# Patient Record
Sex: MALE | Race: WHITE | NOT HISPANIC OR LATINO
[De-identification: names, ages, dates, MRNs, and addresses within clinical notes are randomized per-mention and may not be internally consistent; named-entity substitution may affect disease eponyms.]

---

## 2024-09-16 ENCOUNTER — APPOINTMENT (OUTPATIENT)
Dept: ORTHOPEDIC SURGERY | Facility: CLINIC | Age: 64
End: 2024-09-16

## 2024-09-16 VITALS — BODY MASS INDEX: 27.83 KG/M2 | WEIGHT: 210 LBS | HEIGHT: 73 IN

## 2024-09-16 DIAGNOSIS — Z78.9 OTHER SPECIFIED HEALTH STATUS: ICD-10-CM

## 2024-09-16 DIAGNOSIS — M65.342 TRIGGER FINGER, LEFT RING FINGER: ICD-10-CM

## 2024-09-16 PROBLEM — Z00.00 ENCOUNTER FOR PREVENTIVE HEALTH EXAMINATION: Status: ACTIVE | Noted: 2024-09-16

## 2024-09-16 PROCEDURE — 99203 OFFICE O/P NEW LOW 30 MIN: CPT | Mod: 25

## 2024-09-16 PROCEDURE — 76942 ECHO GUIDE FOR BIOPSY: CPT

## 2024-09-16 PROCEDURE — 20550 NJX 1 TENDON SHEATH/LIGAMENT: CPT | Mod: LT

## 2024-09-16 PROCEDURE — 73130 X-RAY EXAM OF HAND: CPT | Mod: LT

## 2024-09-16 NOTE — PROCEDURE
[FreeTextEntry3] : Tendon sheath injection l4 a1 was performed. The indication for this procedure was pain and inflammation. The site was prepped with alcohol, betadine, ethyl chloride sprayed topically and sterile technique used. An injection of Betamethasone (Celestone) 1cc of 3mg, Lidocaine 1cc of 1%  was used.  Patient tolerated procedure well.   The risks benefits, and alternatives have been discussed, and verbal consent was obtained.  Ultrasound guidance was indicated for this patient due to prior failure or difficult injection. All ultrasound images have been permanently captured and stored accordingly in our picture archiving and communication system. Visualization of the needle and placement of injection was performed without complication.

## 2024-09-16 NOTE — PHYSICAL EXAM
Wound Progress Note    Chief Complaint   Chief Complaint   Patient presents with   • Wound     Pt reports the insert on his walking shoe buckled while he was walking earlier this week.  Pt states he was unable to fix the problem until he got home 4 miles later. He reports soreness at his plantar arch from same.                                                            Wound Assessments      Wound Heel Right Plantar Traumatic (Active)   Date First Assessed/Time First Assessed: 09/22/23 1014   Location: Heel  Laterality: Right  Modifier: Plantar  Primary Wound Type: Traumatic      Assessments 10/13/2023 10:00 AM   Wound Image     Dressing Assessment Intact   Dressing Activity Changed   Dressing Changed On   10/13/23   Wound Exudate Small;Serous   Wound Bed/Tissue Type Pink;White   Wound Last Measured 10/13/23   Wound Length (cm) 0.5 cm   Wound Width (cm) 0.2 cm   Wound Depth (cm) 0.1 cm   Wound Surface Area (cm^2) 0.1 cm^2   Wound Volume (cm^3) 0.01 cm^3   Wound Volume Change (Initial) -0.05 cm3   Wound Volume % Change (Initial) -81.82 %       10/13/23 1000   Treatment   Wound Location R plantar foot   Treatment Type Debridement   Debridement   Photo Taken Yes   Wound Cleansing  Dermal wound cleanser   Antiseptic Dressing Bacitracin   Primary Wound Dressing Foam silicone dressing with border 3 X 3   Dressing Securement Medipore   Dressing Time Spent (min) 10   Goals   Goals-Short Term (4-6 weeks) Patient and/or family independent with dressing changes        ASSESSMENT  Pt reports his wife has been able to change his dressings every other day.  Self reports he has supplies to have the dressing changed.  Pt did report the right insert on his shoe \"buckled\" earlier this week while he was walking and he was unable to adjust the insert until he got home about 4 miles later. Pt reports soreness at his plantar arch. There is no erythema, no open wound, no signs of irritation to the area of his concern.    The heel wound  with reduction in measurements.  Will continue with current plan of care with pt's wife changing the dressing every other day at home.  If wound does not continue to progress may initiate offloading to the heel area to reduce pressure.    Plan  Pt to be seen weekly for treatment of right plantar heel with treatments to include advanced dressings, wound assessments, debridement as needed, callus debulking as needed, patient education, exudate management.    Maday Dunn, PT  10/13/2023                 [4th] : 4th [A1-Pulley] : A1-pulley [Left] : left hand [No acute displaced fracture or dislocation] : No acute displaced fracture or dislocation

## 2024-09-16 NOTE — HISTORY OF PRESENT ILLNESS
[Gradual] : gradual [8] : 8 [Sharp] : sharp [Stabbing] : stabbing [Constant] : constant [Injection therapy] : injection therapy [Nothing helps with pain getting better] : Nothing helps with pain getting better [Retired] : Work status: retired [] : no [FreeTextEntry1] : left third finger  [FreeTextEntry5] : 9/16 l 4 finger stiffness and pain s/p injections in fl [de-identified] : grasping squeezing

## 2024-10-18 ENCOUNTER — APPOINTMENT (OUTPATIENT)
Dept: ORTHOPEDIC SURGERY | Facility: CLINIC | Age: 64
End: 2024-10-18

## 2024-10-18 VITALS — BODY MASS INDEX: 27.83 KG/M2 | WEIGHT: 210 LBS | HEIGHT: 73 IN

## 2024-10-18 DIAGNOSIS — M65.342 TRIGGER FINGER, LEFT RING FINGER: ICD-10-CM

## 2024-10-18 PROCEDURE — 99213 OFFICE O/P EST LOW 20 MIN: CPT | Mod: 25

## 2024-10-18 PROCEDURE — 76942 ECHO GUIDE FOR BIOPSY: CPT

## 2024-10-18 PROCEDURE — 20550 NJX 1 TENDON SHEATH/LIGAMENT: CPT | Mod: RT

## 2024-10-18 PROCEDURE — 73130 X-RAY EXAM OF HAND: CPT | Mod: RT

## 2024-11-11 ENCOUNTER — APPOINTMENT (OUTPATIENT)
Dept: ORTHOPEDIC SURGERY | Facility: CLINIC | Age: 64
End: 2024-11-11
Payer: MEDICARE

## 2024-11-11 VITALS — BODY MASS INDEX: 27.83 KG/M2 | HEIGHT: 73 IN | WEIGHT: 210 LBS

## 2024-11-11 DIAGNOSIS — M77.12 LATERAL EPICONDYLITIS, LEFT ELBOW: ICD-10-CM

## 2024-11-11 DIAGNOSIS — Z78.9 OTHER SPECIFIED HEALTH STATUS: ICD-10-CM

## 2024-11-11 DIAGNOSIS — M65.342 TRIGGER FINGER, LEFT RING FINGER: ICD-10-CM

## 2024-11-11 PROCEDURE — 73080 X-RAY EXAM OF ELBOW: CPT | Mod: LT

## 2024-11-11 PROCEDURE — 76942 ECHO GUIDE FOR BIOPSY: CPT

## 2024-11-11 PROCEDURE — 20551 NJX 1 TENDON ORIGIN/INSJ: CPT

## 2024-11-11 PROCEDURE — 99213 OFFICE O/P EST LOW 20 MIN: CPT | Mod: 25

## 2024-11-11 RX ORDER — CLOPIDOGREL 75 MG/1
TABLET, FILM COATED ORAL
Refills: 0 | Status: ACTIVE | COMMUNITY